# Patient Record
(demographics unavailable — no encounter records)

---

## 2025-01-15 NOTE — DEVELOPMENTAL MILESTONES
[Uses basic gestures] : uses basic gestures [Says "Earl" or "Mama"] : says "Earl" or "Mama" nonspecifically [Crawls] : crawls [Picks up small objects with 3 fingers] : picks up small objects with 3 fingers and thumb [FreeTextEntry1] : pulls to stand

## 2025-01-15 NOTE — HISTORY OF PRESENT ILLNESS
[Fruit] : fruit [Vegetables] : vegetables [Fish] : fish [Peanut] : peanut [Finger foods] : finger foods [Water] : water [Normal] : Normal [Frequency of stools: ___] : Frequency of stools: [unfilled]  stools [In Crib] : sleeps in crib [Formula ___ oz in 24 hrs] : [unfilled] oz of formula in 24 hours [Brushing teeth] : not brushing teeth [No] : No cigarette smoke exposure [Up to date] : Up to date [Influenza] : Influenza [COVID-19] : COVID-19 [de-identified] : Florina formula, offers solids 3x/day

## 2025-01-15 NOTE — DISCUSSION/SUMMARY
[Normal Growth] : growth [Normal Development] : development [None] : No known medical problems [No Elimination Concerns] : elimination [No Feeding Concerns] : feeding [No Skin Concerns] : skin [Normal Sleep Pattern] : sleep [Family Adaptation] : family adaptation [Infant [place]] : infant independence [Feeding Routine] : feeding routine [Safety] : safety [No Medications] : ~He/She~ is not on any medications [Parent/Guardian] : parent/guardian [FreeTextEntry1] : Continue breastmilk or formula as desired. Increase table foods, 3 meals with 2-3 snacks per day. Incorporate up to 6 oz of flourinated water daily in a sippy cup. Discussed weaning of bottle and pacifier. Wipe teeth daily with washcloth. When in car, patient should be in rear-facing car seat in back seat. Put baby to sleep in own crib with no loose or soft bedding. Lower crib mattress. Help baby to maintain consistent daily routines and sleep schedule. Recognize stranger anxiety. Ensure home is safe since baby is increasingly mobile. Be within arm's reach of baby at all times. Use consistent, positive discipline. Avoid screen time. Read aloud to baby. RTO in 1 week for Hep B #3 RTO in 3 months for WCC, sooner with any otehr concerns

## 2025-01-15 NOTE — HISTORY OF PRESENT ILLNESS
[Fruit] : fruit [Vegetables] : vegetables [Fish] : fish [Peanut] : peanut [Finger foods] : finger foods [Water] : water [Normal] : Normal [Frequency of stools: ___] : Frequency of stools: [unfilled]  stools [In Crib] : sleeps in crib [Formula ___ oz in 24 hrs] : [unfilled] oz of formula in 24 hours [Brushing teeth] : not brushing teeth [No] : No cigarette smoke exposure [Up to date] : Up to date [Influenza] : Influenza [COVID-19] : COVID-19 [de-identified] : Florina formula, offers solids 3x/day

## 2025-01-15 NOTE — PHYSICAL EXAM
[Alert] : alert [Normocephalic] : normocephalic [Flat Open Anterior [place]] : flat open anterior fontanelle [Red Reflex] : red reflex bilateral [PERRL] : PERRL [Normally Placed Ears] : normally placed ears [Auricles Well Formed] : auricles well formed [Clear Tympanic membranes] : clear tympanic membranes [Light reflex present] : light reflex present [Bony landmarks visible] : bony landmarks visible [Nares Patent] : nares patent [Palate Intact] : palate intact [Uvula Midline] : uvula midline [Supple, full passive range of motion] : supple, full passive range of motion [Symmetric Chest Rise] : symmetric chest rise [Clear to Auscultation Bilaterally] : clear to auscultation bilaterally [Regular Rate and Rhythm] : regular rate and rhythm [S1, S2 present] : S1, S2 present [+2 Femoral Pulses] : (+) 2 femoral pulses [Soft] : soft [Bowel Sounds] : bowel sounds present [Normal External Genitalia] : normal external genitalia [Normal Vaginal Introitus] : normal vaginal introitus [No Abnormal Lymph Nodes Palpated] : no abnormal lymph nodes palpated [Symmetric abduction and rotation of hips] : symmetric abduction and rotation of hips [Straight] : straight [Cranial Nerves Grossly Intact] : cranial nerves grossly intact [Acute Distress] : no acute distress [Excessive Tearing] : no excessive tearing [Discharge] : no discharge [Palpable Masses] : no palpable masses [Murmurs] : no murmurs [Tender] : nontender [Distended] : nondistended [Hepatomegaly] : no hepatomegaly [Splenomegaly] : no splenomegaly [Clitoromegaly] : no clitoromegaly [Allis Sign] : negative Allis sign [Rash or Lesions] : no rash/lesions

## 2025-01-15 NOTE — PHYSICAL EXAM
[Alert] : alert [Normocephalic] : normocephalic [Flat Open Anterior [place]] : flat open anterior fontanelle [Red Reflex] : red reflex bilateral [PERRL] : PERRL [Normally Placed Ears] : normally placed ears [Auricles Well Formed] : auricles well formed [Clear Tympanic membranes] : clear tympanic membranes [Light reflex present] : light reflex present [Bony landmarks visible] : bony landmarks visible [Nares Patent] : nares patent [Palate Intact] : palate intact [Uvula Midline] : uvula midline [Supple, full passive range of motion] : supple, full passive range of motion [Symmetric Chest Rise] : symmetric chest rise [Clear to Auscultation Bilaterally] : clear to auscultation bilaterally [Regular Rate and Rhythm] : regular rate and rhythm [S1, S2 present] : S1, S2 present [+2 Femoral Pulses] : (+) 2 femoral pulses [Soft] : soft [Bowel Sounds] : bowel sounds present [Normal External Genitalia] : normal external genitalia [Normal Vaginal Introitus] : normal vaginal introitus [No Abnormal Lymph Nodes Palpated] : no abnormal lymph nodes palpated [Symmetric abduction and rotation of hips] : symmetric abduction and rotation of hips [Straight] : straight [Cranial Nerves Grossly Intact] : cranial nerves grossly intact [Acute Distress] : no acute distress [Excessive Tearing] : no excessive tearing [Discharge] : no discharge [Palpable Masses] : no palpable masses [Murmurs] : no murmurs [Tender] : nontender [Distended] : nondistended [Hepatomegaly] : no hepatomegaly [Splenomegaly] : no splenomegaly [Clitoromegaly] : no clitoromegaly [Allis Sign] : negative Allis sign [Rash or Lesions] : no rash/lesions

## 2025-01-15 NOTE — DISCUSSION/SUMMARY
[Normal Growth] : growth [Normal Development] : development [None] : No known medical problems [No Elimination Concerns] : elimination [No Feeding Concerns] : feeding [No Skin Concerns] : skin [Normal Sleep Pattern] : sleep [Family Adaptation] : family adaptation [Infant [place]] : infant independence [Feeding Routine] : feeding routine [Safety] : safety [No Medications] : ~He/She~ is not on any medications [Parent/Guardian] : parent/guardian [FreeTextEntry1] : Continue breastmilk or formula as desired. Increase table foods, 3 meals with 2-3 snacks per day. Incorporate up to 6 oz of flourinated water daily in a sippy cup. Discussed weaning of bottle and pacifier. Wipe teeth daily with washcloth. When in car, patient should be in rear-facing car seat in back seat. Put baby to sleep in own crib with no loose or soft bedding. Lower crib mattress. Help baby to maintain consistent daily routines and sleep schedule. Recognize stranger anxiety. Ensure home is safe since baby is increasingly mobile. Be within arm's reach of baby at all times. Use consistent, positive discipline. Avoid screen time. Read aloud to baby. RTO in 1 week for Hep B #3 RTO in 3 months for WCC, sooner with any otehr concerns

## 2025-04-28 NOTE — DISCUSSION/SUMMARY
[FreeTextEntry1] :  13 month old female with acute UR & teething.  Likely viral - no indication for antibiotic use at this time.   Supportive care reviewed -- may use Zarbees PRN, to start Zyrtec 2.5ml qHS PRN, Nasal saline PRN, cool mist humidifier, steam up bathroom.   Teething care reviewed- no [place], cool teething toys. Motrin or Tylenol PRN pain/fever - dosing/interval reviewed. Good hydration discussed & good hand hygiene reviewed  If fever develops > 48hr or condition worsens return for re-eval. RED FLAGS REVIEWED - indications for ED eval discussed, signs of distress/dehydration reviewed - parents agree with plan, demonstrates an understanding, is able to repeat back instructions and has no questions at this time.   Once feeling better may resume normal activity & diet.  Return sooner PRN.  Well care as scheduled.

## 2025-04-28 NOTE — HISTORY OF PRESENT ILLNESS
[de-identified] : congestion  [FreeTextEntry6] : Presents with c/o cough/congestion x 2 weeks. Seems improved.  At started had decreased appetite for 2 days but now back to baseline.  +teething.  NO fever.  Meds given: Tylenol PRN. Cool mist humidifier.  Appetite/activity at baseline, drinking well, good UO.  No vomiting/No diarrhea - BM 3x/day. + music class - sick contacts.

## 2025-04-28 NOTE — HISTORY OF PRESENT ILLNESS
[de-identified] : congestion  [FreeTextEntry6] : Presents with c/o cough/congestion x 2 weeks. Seems improved.  At started had decreased appetite for 2 days but now back to baseline.  +teething.  NO fever.  Meds given: Tylenol PRN. Cool mist humidifier.  Appetite/activity at baseline, drinking well, good UO.  No vomiting/No diarrhea - BM 3x/day. + music class - sick contacts.